# Patient Record
Sex: FEMALE | Race: WHITE | NOT HISPANIC OR LATINO | Employment: STUDENT | URBAN - METROPOLITAN AREA
[De-identification: names, ages, dates, MRNs, and addresses within clinical notes are randomized per-mention and may not be internally consistent; named-entity substitution may affect disease eponyms.]

---
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
## 2023-01-05 ENCOUNTER — HOSPITAL ENCOUNTER (OUTPATIENT)
Dept: ULTRASOUND IMAGING | Facility: HOSPITAL | Age: 18
Discharge: HOME | End: 2023-01-05
Admitting: PHYSICIAN ASSISTANT
Payer: COMMERCIAL

## 2023-01-05 DIAGNOSIS — R59.0 POSTERIOR AURICULAR LYMPHADENOPATHY: ICD-10-CM

## 2023-01-05 PROCEDURE — 76536 US EXAM OF HEAD AND NECK: CPT

## 2023-01-25 ENCOUNTER — TELEPHONE (OUTPATIENT)
Dept: INTERNAL MEDICINE | Facility: CLINIC | Age: 18
End: 2023-01-25
Payer: COMMERCIAL

## 2023-01-26 NOTE — TELEPHONE ENCOUNTER
I can see her tomorrow either in person or telehealth, just find a time for her please.  Could also do Monday between 12-1

## 2023-02-08 ENCOUNTER — TELEMEDICINE (OUTPATIENT)
Dept: INTERNAL MEDICINE | Facility: CLINIC | Age: 18
End: 2023-02-08
Payer: COMMERCIAL

## 2023-02-08 DIAGNOSIS — R10.13 EPIGASTRIC PAIN: ICD-10-CM

## 2023-02-08 DIAGNOSIS — R07.9 CHEST PAIN, UNSPECIFIED TYPE: Primary | ICD-10-CM

## 2023-02-08 PROCEDURE — 99213 OFFICE O/P EST LOW 20 MIN: CPT | Performed by: INTERNAL MEDICINE

## 2023-02-08 RX ORDER — HYOSCYAMINE SULFATE 0.125 MG
0.12 TABLET ORAL EVERY 4 HOURS PRN
Qty: 60 TABLET | Refills: 0 | Status: SHIPPED | OUTPATIENT
Start: 2023-02-08

## 2023-02-08 RX ORDER — DICYCLOMINE HYDROCHLORIDE 10 MG/1
10 CAPSULE ORAL
Qty: 60 CAPSULE | Refills: 0 | Status: SHIPPED | OUTPATIENT
Start: 2023-02-08

## 2023-02-08 RX ORDER — FLUTICASONE PROPIONATE 50 MCG
SPRAY, SUSPENSION (ML) NASAL
COMMUNITY
Start: 2023-02-06

## 2023-02-08 NOTE — PROGRESS NOTES
TELEHEALTH VISIT  Mode of Visit: Video  Location of patient: home/college home  Dr. marques in office  You have chosen to receive care through a telehealth visit.  Patient understanding that this is a telehealth visit.  I cannot perform a full physical exam, therefore something might be missed, or patient may need to come into the office for further evaluation.  Patient is understanding and consents to this type of visit.  The visit included audio and video interaction.     Chief Complaint  Med Refill    Subjective          Latonia Pulido presents to [facility]  History of Present Illness     Having a cramping sensation in her chest  It is a very painful sensation  Food maybe makes a difference  Movement doesn't  Hasn't had relief with acid pills    No other concerns today      Objective   Physical Exam   Constitutional: She appears well-developed and well-nourished.   HENT:   Head: Normocephalic and atraumatic.   Nose: Nose normal.   Eyes: Pupils are equal, round, and reactive to light. EOM are normal.   Neck: Neck normal appearance.  Pulmonary/Chest: Effort normal.   Neurological: She is alert.   Psychiatric: She has a normal mood and affect.     Result Review :       Common labs    Common Labs 5/19/22 5/19/22 5/19/22    1002 1002 1002   Glucose  79    BUN  9    Creatinine  0.71    Sodium  139    Potassium  4.3    Chloride  103    Calcium  10.2    Albumin  4.70    Total Bilirubin  0.2    Alkaline Phosphatase  60    AST (SGOT)  21    ALT (SGPT)  21    WBC 5.16     Hemoglobin 12.2     Hematocrit 35.8     Platelets 345     Total Cholesterol   230 (A)   Triglycerides   52   HDL Cholesterol   116 (A)   LDL Cholesterol    105 (A)   (A) Abnormal value              Results for orders placed or performed in visit on 05/19/22   Comprehensive Metabolic Panel    Specimen: Arm, Left; Blood   Result Value Ref Range    Glucose 79 65 - 99 mg/dL    BUN 9 6 - 20 mg/dL    Creatinine  0.71 0.57 - 1.00 mg/dL    Sodium 139 136 - 145 mmol/L    Potassium 4.3 3.5 - 5.2 mmol/L    Chloride 103 98 - 107 mmol/L    CO2 27.0 22.0 - 29.0 mmol/L    Calcium 10.2 8.6 - 10.5 mg/dL    Total Protein 7.4 6.0 - 8.5 g/dL    Albumin 4.70 3.50 - 5.20 g/dL    ALT (SGPT) 21 1 - 33 U/L    AST (SGOT) 21 1 - 32 U/L    Alkaline Phosphatase 60 39 - 117 U/L    Total Bilirubin 0.2 0.0 - 1.2 mg/dL    Globulin 2.7 gm/dL    A/G Ratio 1.7 g/dL    BUN/Creatinine Ratio 12.7 7.0 - 25.0    Anion Gap 9.0 5.0 - 15.0 mmol/L    eGFR 125.8 >60.0 mL/min/1.73   TSH    Specimen: Arm, Left; Blood   Result Value Ref Range    TSH 1.830 0.270 - 4.200 uIU/mL   Lipid Panel    Specimen: Arm, Left; Blood   Result Value Ref Range    Total Cholesterol 230 (H) 0 - 200 mg/dL    Triglycerides 52 0 - 150 mg/dL    HDL Cholesterol 116 (H) 40 - 60 mg/dL    LDL Cholesterol  105 (H) 0 - 100 mg/dL    VLDL Cholesterol 9 5 - 40 mg/dL    LDL/HDL Ratio 0.89    Vitamin B12 & Folate    Specimen: Arm, Left; Blood   Result Value Ref Range    Folate 14.60 4.78 - 24.20 ng/mL    Vitamin B-12 435 211 - 946 pg/mL   Vitamin D 25 Hydroxy    Specimen: Arm, Left; Blood   Result Value Ref Range    25 Hydroxy, Vitamin D 31.2 30.0 - 100.0 ng/ml   Iron Profile    Specimen: Arm, Left; Blood   Result Value Ref Range    Iron 76 37 - 145 mcg/dL    Iron Saturation 21 20 - 50 %    Transferrin 246 200 - 360 mg/dL    TIBC 367 298 - 536 mcg/dL   Sedimentation Rate    Specimen: Arm, Left; Blood   Result Value Ref Range    Sed Rate 6 0 - 20 mm/hr   Celiac Comprehensive Panel    Specimen: Arm, Left; Blood   Result Value Ref Range    Gliadin Deamidated Peptide Ab, IgA 4 0 - 19 units    Deaminated Gliadin Ab IgG 2 0 - 19 units    Tissue Transglutaminase IgA <2 0 - 3 U/mL    Tissue Transglutaminase IgG <2 0 - 5 U/mL    Endomysial IgA Negative Negative    IgA 111 87 - 352 mg/dL   C-reactive protein    Specimen: Arm, Left; Blood   Result Value Ref Range    C-Reactive Protein <0.30 0.00 - 0.50 mg/dL    CBC Auto Differential    Specimen: Arm, Left; Blood   Result Value Ref Range    WBC 5.16 3.40 - 10.80 10*3/mm3    RBC 3.98 3.77 - 5.28 10*6/mm3    Hemoglobin 12.2 12.0 - 15.9 g/dL    Hematocrit 35.8 34.0 - 46.6 %    MCV 89.9 79.0 - 97.0 fL    MCH 30.7 26.6 - 33.0 pg    MCHC 34.1 31.5 - 35.7 g/dL    RDW 12.8 12.3 - 15.4 %    RDW-SD 41.9 37.0 - 54.0 fl    MPV 9.6 6.0 - 12.0 fL    Platelets 345 140 - 450 10*3/mm3    Neutrophil % 55.9 42.7 - 76.0 %    Lymphocyte % 32.2 19.6 - 45.3 %    Monocyte % 7.0 5.0 - 12.0 %    Eosinophil % 3.9 0.3 - 6.2 %    Basophil % 0.6 0.0 - 1.5 %    Immature Grans % 0.4 0.0 - 0.5 %    Neutrophils, Absolute 2.89 1.70 - 7.00 10*3/mm3    Lymphocytes, Absolute 1.66 0.70 - 3.10 10*3/mm3    Monocytes, Absolute 0.36 0.10 - 0.90 10*3/mm3    Eosinophils, Absolute 0.20 0.00 - 0.40 10*3/mm3    Basophils, Absolute 0.03 0.00 - 0.20 10*3/mm3    Immature Grans, Absolute 0.02 0.00 - 0.05 10*3/mm3    nRBC 0.0 0.0 - 0.2 /100 WBC            Procedures        Assessment and Plan    Diagnoses and all orders for this visit:    1. Chest pain, unspecified type (Primary)  -     Ambulatory Referral to Gastroenterology    2. Epigastric pain  -     Ambulatory Referral to Gastroenterology    Other orders  -     dicyclomine (BENTYL) 10 MG capsule; Take 1 capsule by mouth 4 (Four) Times a Day Before Meals & at Bedtime.  Dispense: 60 capsule; Refill: 0  -     hyoscyamine (ANASPAZ,LEVSIN) 0.125 MG tablet; Take 1 tablet by mouth Every 4 (Four) Hours As Needed for Cramping.  Dispense: 60 tablet; Refill: 0    concerning for esophageal spasm  Will try bentyl and or hyoscyamine to see if one can help  Will refer to GI to schedule for possible scope  Also discussed considering barium swallow, especially if meds don't help            Follow Up   No follow-ups on file.  Patient was given instructions and counseling regarding her condition or for health maintenance advice. Please see specific information pulled into the AVS if  appropriate.

## 2023-02-09 ENCOUNTER — TELEPHONE (OUTPATIENT)
Dept: INTERNAL MEDICINE | Facility: CLINIC | Age: 18
End: 2023-02-09

## 2023-02-20 ENCOUNTER — TELEPHONE (OUTPATIENT)
Dept: INTERNAL MEDICINE | Facility: CLINIC | Age: 18
End: 2023-02-20